# Patient Record
Sex: FEMALE | URBAN - METROPOLITAN AREA
[De-identification: names, ages, dates, MRNs, and addresses within clinical notes are randomized per-mention and may not be internally consistent; named-entity substitution may affect disease eponyms.]

---

## 2020-06-30 ENCOUNTER — APPOINTMENT (RX ONLY)
Dept: URBAN - METROPOLITAN AREA CLINIC 57 | Facility: CLINIC | Age: 45
Setting detail: DERMATOLOGY
End: 2020-06-30

## 2020-06-30 DIAGNOSIS — L81.4 OTHER MELANIN HYPERPIGMENTATION: ICD-10-CM

## 2020-06-30 DIAGNOSIS — D18.0 HEMANGIOMA: ICD-10-CM

## 2020-06-30 DIAGNOSIS — I83.9 ASYMPTOMATIC VARICOSE VEINS OF LOWER EXTREMITIES: ICD-10-CM

## 2020-06-30 DIAGNOSIS — L82.1 OTHER SEBORRHEIC KERATOSIS: ICD-10-CM

## 2020-06-30 DIAGNOSIS — L91.8 OTHER HYPERTROPHIC DISORDERS OF THE SKIN: ICD-10-CM

## 2020-06-30 PROBLEM — I83.93 ASYMPTOMATIC VARICOSE VEINS OF BILATERAL LOWER EXTREMITIES: Status: ACTIVE | Noted: 2020-06-30

## 2020-06-30 PROBLEM — D18.01 HEMANGIOMA OF SKIN AND SUBCUTANEOUS TISSUE: Status: ACTIVE | Noted: 2020-06-30

## 2020-06-30 PROCEDURE — ? FULL BODY SKIN EXAM

## 2020-06-30 PROCEDURE — 99203 OFFICE O/P NEW LOW 30 MIN: CPT

## 2020-06-30 PROCEDURE — ? ADDITIONAL NOTES

## 2020-06-30 PROCEDURE — ? SKIN TAG REMOVAL (COSMETIC)

## 2020-06-30 PROCEDURE — ? COUNSELING

## 2020-06-30 ASSESSMENT — LOCATION SIMPLE DESCRIPTION DERM
LOCATION SIMPLE: RIGHT THIGH
LOCATION SIMPLE: LEFT THIGH
LOCATION SIMPLE: RIGHT ANTERIOR NECK

## 2020-06-30 ASSESSMENT — LOCATION DETAILED DESCRIPTION DERM
LOCATION DETAILED: RIGHT INFERIOR ANTERIOR NECK
LOCATION DETAILED: LEFT ANTERIOR PROXIMAL THIGH
LOCATION DETAILED: RIGHT SUPERIOR LATERAL NECK
LOCATION DETAILED: RIGHT ANTERIOR PROXIMAL THIGH
LOCATION DETAILED: LEFT ANTERIOR DISTAL THIGH
LOCATION DETAILED: RIGHT ANTERIOR DISTAL THIGH

## 2020-06-30 ASSESSMENT — LOCATION ZONE DERM
LOCATION ZONE: LEG
LOCATION ZONE: NECK

## 2020-06-30 NOTE — PROCEDURE: ADDITIONAL NOTES
Additional Notes: Patient consent was obtained to proceed with the visit and recommended plan of care after discussion of all risks and benefits, including the risks of COVID-19 exposure.
Detail Level: Simple
Additional Notes: Pt was advised to see a vascular surgeon for the legs.

## 2020-06-30 NOTE — PROCEDURE: SKIN TAG REMOVAL (COSMETIC)
Price (Use Numbers Only, No Special Characters Or $): 150
Consent: Written consent obtained and the risks of skin tag removal was reviewed with the patient including but not limited to bleeding, pigmentary change, infection, pain, and remote possibility of scarring.
Anesthesia Type: 1% lidocaine with epinephrine
Detail Level: Detailed
Removed With: scissors

## 2021-05-17 ENCOUNTER — APPOINTMENT (RX ONLY)
Dept: URBAN - METROPOLITAN AREA CLINIC 57 | Facility: CLINIC | Age: 46
Setting detail: DERMATOLOGY
End: 2021-05-17

## 2021-05-17 DIAGNOSIS — L81.4 OTHER MELANIN HYPERPIGMENTATION: ICD-10-CM

## 2021-05-17 DIAGNOSIS — D18.0 HEMANGIOMA: ICD-10-CM

## 2021-05-17 DIAGNOSIS — L70.8 OTHER ACNE: ICD-10-CM

## 2021-05-17 DIAGNOSIS — L57.8 OTHER SKIN CHANGES DUE TO CHRONIC EXPOSURE TO NONIONIZING RADIATION: ICD-10-CM

## 2021-05-17 DIAGNOSIS — D22 MELANOCYTIC NEVI: ICD-10-CM

## 2021-05-17 DIAGNOSIS — L82.1 OTHER SEBORRHEIC KERATOSIS: ICD-10-CM

## 2021-05-17 PROBLEM — D22.9 MELANOCYTIC NEVI, UNSPECIFIED: Status: ACTIVE | Noted: 2021-05-17

## 2021-05-17 PROBLEM — D18.01 HEMANGIOMA OF SKIN AND SUBCUTANEOUS TISSUE: Status: ACTIVE | Noted: 2021-05-17

## 2021-05-17 PROCEDURE — ? FULL BODY SKIN EXAM

## 2021-05-17 PROCEDURE — ? ADDITIONAL NOTES

## 2021-05-17 PROCEDURE — 99213 OFFICE O/P EST LOW 20 MIN: CPT

## 2021-05-17 PROCEDURE — ? TREATMENT REGIMEN

## 2021-05-17 PROCEDURE — ? COUNSELING

## 2021-05-17 ASSESSMENT — LOCATION DETAILED DESCRIPTION DERM
LOCATION DETAILED: INFERIOR THORACIC SPINE
LOCATION DETAILED: RIGHT INFERIOR UPPER BACK
LOCATION DETAILED: LEFT SUPERIOR UPPER BACK
LOCATION DETAILED: PERIUMBILICAL SKIN

## 2021-05-17 ASSESSMENT — LOCATION SIMPLE DESCRIPTION DERM
LOCATION SIMPLE: ABDOMEN
LOCATION SIMPLE: UPPER BACK
LOCATION SIMPLE: RIGHT UPPER BACK
LOCATION SIMPLE: LEFT UPPER BACK

## 2021-05-17 ASSESSMENT — LOCATION ZONE DERM: LOCATION ZONE: TRUNK

## 2023-01-26 ENCOUNTER — NEW PATIENT (OUTPATIENT)
Dept: URBAN - METROPOLITAN AREA CLINIC 53 | Facility: CLINIC | Age: 48
End: 2023-01-26

## 2023-01-26 DIAGNOSIS — H16.223: ICD-10-CM

## 2023-01-26 DIAGNOSIS — H40.023: ICD-10-CM

## 2023-01-26 PROCEDURE — 76514 ECHO EXAM OF EYE THICKNESS: CPT

## 2023-01-26 PROCEDURE — 92133 CPTRZD OPH DX IMG PST SGM ON: CPT

## 2023-01-26 PROCEDURE — 92004 COMPRE OPH EXAM NEW PT 1/>: CPT

## 2023-01-26 ASSESSMENT — VISUAL ACUITY
OD_CC: 20/20
OU_CC: 20/20
OS_CC: 20/20

## 2023-01-26 ASSESSMENT — TONOMETRY
OD_IOP_MMHG: 20
OD_IOP_MMHG: 14
OS_IOP_MMHG: 20
OS_IOP_MMHG: 14

## 2023-01-26 ASSESSMENT — PACHYMETRY
OS_CT_UM: 622
OD_CT_UM: 626

## 2023-06-22 ENCOUNTER — APPOINTMENT (RX ONLY)
Dept: URBAN - METROPOLITAN AREA CLINIC 57 | Facility: CLINIC | Age: 48
Setting detail: DERMATOLOGY
End: 2023-06-22

## 2023-06-22 DIAGNOSIS — L72.0 EPIDERMAL CYST: ICD-10-CM

## 2023-06-22 DIAGNOSIS — D18.0 HEMANGIOMA: ICD-10-CM

## 2023-06-22 DIAGNOSIS — L91.8 OTHER HYPERTROPHIC DISORDERS OF THE SKIN: ICD-10-CM

## 2023-06-22 PROBLEM — D18.01 HEMANGIOMA OF SKIN AND SUBCUTANEOUS TISSUE: Status: ACTIVE | Noted: 2023-06-22

## 2023-06-22 PROCEDURE — 99213 OFFICE O/P EST LOW 20 MIN: CPT

## 2023-06-22 PROCEDURE — ? ADDITIONAL NOTES

## 2023-06-22 PROCEDURE — ? COUNSELING

## 2023-06-22 PROCEDURE — ? PRESCRIPTION

## 2023-06-22 PROCEDURE — ? FULL BODY SKIN EXAM - DECLINED

## 2023-06-22 RX ORDER — TRETIONIN 0.25 MG/G
CREAM TOPICAL QHS
Qty: 20 | Refills: 6 | Status: ERX | COMMUNITY
Start: 2023-06-22

## 2023-06-22 RX ADMIN — TRETIONIN 1: 0.25 CREAM TOPICAL at 00:00

## 2023-06-22 ASSESSMENT — LOCATION DETAILED DESCRIPTION DERM
LOCATION DETAILED: LEFT DISTAL POSTERIOR UPPER ARM
LOCATION DETAILED: LEFT MEDIAL MALAR CHEEK
LOCATION DETAILED: LEFT SUPERIOR MEDIAL MALAR CHEEK
LOCATION DETAILED: RIGHT DISTAL LATERAL POSTERIOR UPPER ARM
LOCATION DETAILED: RIGHT INFERIOR ANTERIOR NECK

## 2023-06-22 ASSESSMENT — LOCATION SIMPLE DESCRIPTION DERM
LOCATION SIMPLE: RIGHT ANTERIOR NECK
LOCATION SIMPLE: LEFT CHEEK
LOCATION SIMPLE: LEFT POSTERIOR UPPER ARM
LOCATION SIMPLE: RIGHT POSTERIOR UPPER ARM

## 2023-06-22 ASSESSMENT — LOCATION ZONE DERM
LOCATION ZONE: NECK
LOCATION ZONE: ARM
LOCATION ZONE: FACE

## 2023-06-22 NOTE — PROCEDURE: COUNSELING
Patient Specific Counseling (Will Not Stick From Patient To Patient): Discussed seeing 
Detail Level: Simple
Detail Level: Detailed

## 2023-06-22 NOTE — PROCEDURE: ADDITIONAL NOTES
Additional Notes: Discussed removal of tags $150 for up to 15
Detail Level: Detailed
Render Risk Assessment In Note?: no

## 2024-02-26 ENCOUNTER — COMPREHENSIVE EXAM (OUTPATIENT)
Dept: URBAN - METROPOLITAN AREA CLINIC 49 | Facility: LOCATION | Age: 49
End: 2024-02-26

## 2024-02-26 DIAGNOSIS — H40.023: ICD-10-CM

## 2024-02-26 DIAGNOSIS — H15.102: ICD-10-CM

## 2024-02-26 DIAGNOSIS — Z01.01: ICD-10-CM

## 2024-02-26 PROCEDURE — 92015 DETERMINE REFRACTIVE STATE: CPT

## 2024-02-26 PROCEDURE — 92014 COMPRE OPH EXAM EST PT 1/>: CPT

## 2024-02-26 RX ORDER — FLUOROMETHOLONE 1 MG/ML
1 SOLUTION/ DROPS OPHTHALMIC TWICE A DAY
Start: 2024-02-26 | End: 2024-03-11

## 2024-02-26 ASSESSMENT — VISUAL ACUITY
OD_CC: 20/20
OU_SC: J1+
OS_CC: 20/20

## 2024-02-26 ASSESSMENT — TONOMETRY
OS_IOP_MMHG: 16
OD_IOP_MMHG: 16
OS_IOP_MMHG: 20
OD_IOP_MMHG: 20

## 2024-05-20 ENCOUNTER — DIAGNOSTICS ONLY (OUTPATIENT)
Dept: URBAN - METROPOLITAN AREA CLINIC 49 | Facility: LOCATION | Age: 49
End: 2024-05-20

## 2024-05-20 DIAGNOSIS — H40.023: ICD-10-CM

## 2024-05-20 PROCEDURE — 92133 CPTRZD OPH DX IMG PST SGM ON: CPT

## 2024-05-20 PROCEDURE — 92083 EXTENDED VISUAL FIELD XM: CPT

## 2025-06-27 ENCOUNTER — COMPREHENSIVE EXAM (OUTPATIENT)
Age: 50
End: 2025-06-27

## 2025-06-27 DIAGNOSIS — H52.4: ICD-10-CM

## 2025-06-27 DIAGNOSIS — Z01.01: ICD-10-CM

## 2025-06-27 PROCEDURE — 92014 COMPRE OPH EXAM EST PT 1/>: CPT

## 2025-06-27 PROCEDURE — 92015 DETERMINE REFRACTIVE STATE: CPT
